# Patient Record
Sex: FEMALE | Race: BLACK OR AFRICAN AMERICAN | Employment: STUDENT | URBAN - METROPOLITAN AREA
[De-identification: names, ages, dates, MRNs, and addresses within clinical notes are randomized per-mention and may not be internally consistent; named-entity substitution may affect disease eponyms.]

---

## 2017-10-09 ENCOUNTER — HOSPITAL ENCOUNTER (OUTPATIENT)
Dept: MAMMOGRAPHY | Facility: HOSPITAL | Age: 42
Discharge: HOME | End: 2017-10-09
Attending: ADVANCED PRACTICE MIDWIFE
Payer: COMMERCIAL

## 2017-10-09 DIAGNOSIS — Z12.31 VISIT FOR SCREENING MAMMOGRAM: ICD-10-CM

## 2017-10-09 PROCEDURE — 77067 SCR MAMMO BI INCL CAD: CPT | Performed by: ADVANCED PRACTICE MIDWIFE

## 2017-10-09 PROCEDURE — 77063 BREAST TOMOSYNTHESIS BI: CPT | Performed by: ADVANCED PRACTICE MIDWIFE

## 2017-10-17 ENCOUNTER — HOSPITAL ENCOUNTER (OUTPATIENT)
Dept: MAMMOGRAPHY | Age: 42
Discharge: HOME | End: 2017-10-17
Attending: ADVANCED PRACTICE MIDWIFE
Payer: COMMERCIAL

## 2017-10-17 ENCOUNTER — HOSPITAL ENCOUNTER (OUTPATIENT)
Dept: ULTRASOUND IMAGING | Age: 42
Discharge: HOME | End: 2017-10-17
Attending: ADVANCED PRACTICE MIDWIFE
Payer: COMMERCIAL

## 2017-10-17 DIAGNOSIS — R92.8 ABNORMAL MAMMOGRAM OF LEFT BREAST: ICD-10-CM

## 2017-10-17 PROCEDURE — 76642 ULTRASOUND BREAST LIMITED: CPT | Performed by: ADVANCED PRACTICE MIDWIFE

## 2017-10-17 PROCEDURE — 77065 DX MAMMO INCL CAD UNI: CPT | Performed by: ADVANCED PRACTICE MIDWIFE

## 2017-10-17 PROCEDURE — 77061 BREAST TOMOSYNTHESIS UNI: CPT | Performed by: ADVANCED PRACTICE MIDWIFE

## 2018-11-06 ENCOUNTER — HOSPITAL ENCOUNTER (OUTPATIENT)
Dept: MAMMOGRAPHY | Facility: HOSPITAL | Age: 43
Discharge: HOME | End: 2018-11-06
Attending: ADVANCED PRACTICE MIDWIFE
Payer: COMMERCIAL

## 2018-11-06 DIAGNOSIS — Z12.31 SCREENING MAMMOGRAM, ENCOUNTER FOR: ICD-10-CM

## 2018-11-06 PROCEDURE — 77063 BREAST TOMOSYNTHESIS BI: CPT | Performed by: ADVANCED PRACTICE MIDWIFE

## 2018-11-06 PROCEDURE — 77067 SCR MAMMO BI INCL CAD: CPT | Performed by: ADVANCED PRACTICE MIDWIFE

## 2019-02-08 ENCOUNTER — GENETICS ENCOUNTER (OUTPATIENT)
Dept: GENETICS | Facility: HOSPITAL | Age: 44
End: 2019-02-08
Attending: GENETIC COUNSELOR, MS
Payer: COMMERCIAL

## 2019-02-08 PROCEDURE — 96040 HC GENETIC COUNSELING EA 30 MIN: CPT | Performed by: GENETIC COUNSELOR, MS

## 2019-02-11 NOTE — PROGRESS NOTES
Referring Provider: Dr. Paloma Otoole    Reason for Referral:  Ms. Praveen Bae was referred for genetic counseling because of a family history of breast cancer. Ms. Jesse Monroe has no personal history of cancer. She has uterine fibroids.   She had a uter see the pedigree for additional family history information. Counseling: The following information was discussed with Ms. Murphy    Genetics of Breast Cancer: In the [place], approximately 1 in 8 women (12%) will develop breast cancer.   Alth DNA change that may or may not alter the function of the gene; therefore, it is usually not possible to determine if the gene variant is responsible for an individual’s increased cancer risk. If Ms. Murphy is found to carry a pathogenic variant in a ca not feel genetic test results would be helpful to her. Given the relatively low likelihood of finding a mutation and the limited medical care available to her in [facility], Ms. Murphy was reassured that forgoing genetic testing is a reasonable choice.   Dain Knowles

## 2019-02-19 RX ORDER — PYRIDOXINE HCL (VITAMIN B6) 100 MG
1 TABLET ORAL DAILY
Status: ON HOLD | COMMUNITY
End: 2019-03-05

## 2019-03-04 ENCOUNTER — ANESTHESIA EVENT (OUTPATIENT)
Dept: SURGERY | Facility: HOSPITAL | Age: 44
End: 2019-03-04
Payer: COMMERCIAL

## 2019-03-05 ENCOUNTER — ANESTHESIA (OUTPATIENT)
Dept: SURGERY | Facility: HOSPITAL | Age: 44
End: 2019-03-05
Payer: COMMERCIAL

## 2019-03-05 ENCOUNTER — HOSPITAL ENCOUNTER (OUTPATIENT)
Facility: HOSPITAL | Age: 44
Discharge: HOME | End: 2019-03-06
Attending: OBSTETRICS & GYNECOLOGY | Admitting: OBSTETRICS & GYNECOLOGY
Payer: COMMERCIAL

## 2019-03-05 PROBLEM — D21.9 LEIOMYOMA: Status: ACTIVE | Noted: 2019-03-05

## 2019-03-05 LAB
POCT LOT NUMBER: NORMAL
POCT URINE PREGNANCY: NEGATIVE

## 2019-03-05 PROCEDURE — 81025 URINE PREGNANCY TEST: CPT | Performed by: OBSTETRICS & GYNECOLOGY

## 2019-03-05 PROCEDURE — 0TJB8ZZ INSPECTION OF BLADDER, VIA NATURAL OR ARTIFICIAL OPENING ENDOSCOPIC: ICD-10-PCS | Performed by: OBSTETRICS & GYNECOLOGY

## 2019-03-05 PROCEDURE — 0UT74ZZ RESECTION OF BILATERAL FALLOPIAN TUBES, PERCUTANEOUS ENDOSCOPIC APPROACH: ICD-10-PCS | Performed by: OBSTETRICS & GYNECOLOGY

## 2019-03-05 PROCEDURE — 0UT94ZZ RESECTION OF UTERUS, PERCUTANEOUS ENDOSCOPIC APPROACH: ICD-10-PCS | Performed by: OBSTETRICS & GYNECOLOGY

## 2019-03-05 PROCEDURE — 8E0W4CZ ROBOTIC ASSISTED PROCEDURE OF TRUNK REGION, PERCUTANEOUS ENDOSCOPIC APPROACH: ICD-10-PCS | Performed by: OBSTETRICS & GYNECOLOGY

## 2019-03-05 PROCEDURE — 88307 TISSUE EXAM BY PATHOLOGIST: CPT | Performed by: OBSTETRICS & GYNECOLOGY

## 2019-03-05 RX ORDER — SODIUM CHLORIDE, SODIUM LACTATE, POTASSIUM CHLORIDE, CALCIUM CHLORIDE 600; 310; 30; 20 MG/100ML; MG/100ML; MG/100ML; MG/100ML
INJECTION, SOLUTION INTRAVENOUS CONTINUOUS
Status: DISCONTINUED | OUTPATIENT
Start: 2019-03-05 | End: 2019-03-05 | Stop reason: HOSPADM

## 2019-03-05 RX ORDER — HYDROMORPHONE HYDROCHLORIDE 1 MG/ML
0.2 INJECTION, SOLUTION INTRAMUSCULAR; INTRAVENOUS; SUBCUTANEOUS EVERY 2 HOUR PRN
Status: DISCONTINUED | OUTPATIENT
Start: 2019-03-05 | End: 2019-03-06

## 2019-03-05 RX ORDER — METOCLOPRAMIDE HYDROCHLORIDE 5 MG/ML
10 INJECTION INTRAMUSCULAR; INTRAVENOUS AS NEEDED
Status: DISCONTINUED | OUTPATIENT
Start: 2019-03-05 | End: 2019-03-05 | Stop reason: HOSPADM

## 2019-03-05 RX ORDER — HYDROCODONE BITARTRATE AND ACETAMINOPHEN 5; 325 MG/1; MG/1
2 TABLET ORAL EVERY 4 HOURS PRN
Status: DISCONTINUED | OUTPATIENT
Start: 2019-03-05 | End: 2019-03-05

## 2019-03-05 RX ORDER — ZOLPIDEM TARTRATE 5 MG/1
5 TABLET ORAL NIGHTLY PRN
Status: DISCONTINUED | OUTPATIENT
Start: 2019-03-05 | End: 2019-03-06

## 2019-03-05 RX ORDER — HYDROMORPHONE HYDROCHLORIDE 1 MG/ML
0.4 INJECTION, SOLUTION INTRAMUSCULAR; INTRAVENOUS; SUBCUTANEOUS EVERY 2 HOUR PRN
Status: DISCONTINUED | OUTPATIENT
Start: 2019-03-05 | End: 2019-03-06

## 2019-03-05 RX ORDER — HYDROCODONE BITARTRATE AND ACETAMINOPHEN 5; 325 MG/1; MG/1
1 TABLET ORAL EVERY 4 HOURS PRN
Status: DISCONTINUED | OUTPATIENT
Start: 2019-03-05 | End: 2019-03-05

## 2019-03-05 RX ORDER — HEPARIN SODIUM 5000 [USP'U]/ML
5000 INJECTION, SOLUTION INTRAVENOUS; SUBCUTANEOUS ONCE
Status: COMPLETED | OUTPATIENT
Start: 2019-03-05 | End: 2019-03-05

## 2019-03-05 RX ORDER — ACETAMINOPHEN 500 MG
1000 TABLET ORAL ONCE
Status: DISCONTINUED | OUTPATIENT
Start: 2019-03-05 | End: 2019-03-05

## 2019-03-05 RX ORDER — ONDANSETRON 4 MG/1
4 TABLET, FILM COATED ORAL EVERY 8 HOURS PRN
Status: DISCONTINUED | OUTPATIENT
Start: 2019-03-05 | End: 2019-03-06

## 2019-03-05 RX ORDER — ACETAMINOPHEN 10 MG/ML
1000 INJECTION, SOLUTION INTRAVENOUS EVERY 6 HOURS
Status: COMPLETED | OUTPATIENT
Start: 2019-03-05 | End: 2019-03-06

## 2019-03-05 RX ORDER — METOCLOPRAMIDE HYDROCHLORIDE 5 MG/ML
10 INJECTION INTRAMUSCULAR; INTRAVENOUS EVERY 6 HOURS PRN
Status: DISCONTINUED | OUTPATIENT
Start: 2019-03-05 | End: 2019-03-06

## 2019-03-05 RX ORDER — BUPIVACAINE HYDROCHLORIDE AND EPINEPHRINE 5; 5 MG/ML; UG/ML
INJECTION, SOLUTION EPIDURAL; INTRACAUDAL; PERINEURAL AS NEEDED
Status: DISCONTINUED | OUTPATIENT
Start: 2019-03-05 | End: 2019-03-05 | Stop reason: HOSPADM

## 2019-03-05 RX ORDER — NALOXONE HYDROCHLORIDE 0.4 MG/ML
80 INJECTION, SOLUTION INTRAMUSCULAR; INTRAVENOUS; SUBCUTANEOUS AS NEEDED
Status: DISCONTINUED | OUTPATIENT
Start: 2019-03-05 | End: 2019-03-05 | Stop reason: HOSPADM

## 2019-03-05 RX ORDER — HYDROCODONE BITARTRATE AND ACETAMINOPHEN 5; 325 MG/1; MG/1
1 TABLET ORAL EVERY 4 HOURS PRN
Status: DISCONTINUED | OUTPATIENT
Start: 2019-03-06 | End: 2019-03-06

## 2019-03-05 RX ORDER — ONDANSETRON 2 MG/ML
4 INJECTION INTRAMUSCULAR; INTRAVENOUS EVERY 8 HOURS PRN
Status: DISCONTINUED | OUTPATIENT
Start: 2019-03-05 | End: 2019-03-06

## 2019-03-05 RX ORDER — LABETALOL HYDROCHLORIDE 5 MG/ML
5 INJECTION, SOLUTION INTRAVENOUS EVERY 5 MIN PRN
Status: DISCONTINUED | OUTPATIENT
Start: 2019-03-05 | End: 2019-03-05 | Stop reason: HOSPADM

## 2019-03-05 RX ORDER — HYDROMORPHONE HYDROCHLORIDE 1 MG/ML
0.8 INJECTION, SOLUTION INTRAMUSCULAR; INTRAVENOUS; SUBCUTANEOUS EVERY 2 HOUR PRN
Status: DISCONTINUED | OUTPATIENT
Start: 2019-03-05 | End: 2019-03-06

## 2019-03-05 RX ORDER — KETOROLAC TROMETHAMINE 30 MG/ML
30 INJECTION, SOLUTION INTRAMUSCULAR; INTRAVENOUS EVERY 6 HOURS
Status: DISCONTINUED | OUTPATIENT
Start: 2019-03-05 | End: 2019-03-06

## 2019-03-05 RX ORDER — ACETAMINOPHEN 325 MG/1
650 TABLET ORAL EVERY 4 HOURS PRN
Status: DISCONTINUED | OUTPATIENT
Start: 2019-03-05 | End: 2019-03-05

## 2019-03-05 RX ORDER — FAMOTIDINE 20 MG/1
20 TABLET ORAL 2 TIMES DAILY PRN
Status: DISCONTINUED | OUTPATIENT
Start: 2019-03-05 | End: 2019-03-06

## 2019-03-05 RX ORDER — HYDROMORPHONE HYDROCHLORIDE 1 MG/ML
0.4 INJECTION, SOLUTION INTRAMUSCULAR; INTRAVENOUS; SUBCUTANEOUS EVERY 5 MIN PRN
Status: DISCONTINUED | OUTPATIENT
Start: 2019-03-05 | End: 2019-03-05 | Stop reason: HOSPADM

## 2019-03-05 RX ORDER — DEXTROSE MONOHYDRATE, SODIUM CHLORIDE, SODIUM LACTATE, POTASSIUM CHLORIDE, CALCIUM CHLORIDE 5; 600; 310; 179; 20 G/100ML; MG/100ML; MG/100ML; MG/100ML; MG/100ML
INJECTION, SOLUTION INTRAVENOUS CONTINUOUS
Status: DISCONTINUED | OUTPATIENT
Start: 2019-03-05 | End: 2019-03-06

## 2019-03-05 RX ORDER — SODIUM CHLORIDE, SODIUM LACTATE, POTASSIUM CHLORIDE, CALCIUM CHLORIDE 600; 310; 30; 20 MG/100ML; MG/100ML; MG/100ML; MG/100ML
INJECTION, SOLUTION INTRAVENOUS CONTINUOUS
Status: DISCONTINUED | OUTPATIENT
Start: 2019-03-05 | End: 2019-03-05

## 2019-03-05 RX ORDER — HYDROCODONE BITARTRATE AND ACETAMINOPHEN 5; 325 MG/1; MG/1
2 TABLET ORAL EVERY 4 HOURS PRN
Status: DISCONTINUED | OUTPATIENT
Start: 2019-03-06 | End: 2019-03-06

## 2019-03-05 RX ORDER — ACETAMINOPHEN 325 MG/1
650 TABLET ORAL EVERY 4 HOURS PRN
Status: DISCONTINUED | OUTPATIENT
Start: 2019-03-06 | End: 2019-03-06

## 2019-03-05 RX ORDER — DIPHENHYDRAMINE HYDROCHLORIDE 50 MG/ML
12.5 INJECTION INTRAMUSCULAR; INTRAVENOUS EVERY 4 HOURS PRN
Status: DISCONTINUED | OUTPATIENT
Start: 2019-03-05 | End: 2019-03-06

## 2019-03-05 RX ORDER — ONDANSETRON 2 MG/ML
4 INJECTION INTRAMUSCULAR; INTRAVENOUS AS NEEDED
Status: DISCONTINUED | OUTPATIENT
Start: 2019-03-05 | End: 2019-03-05 | Stop reason: HOSPADM

## 2019-03-05 RX ORDER — CEFAZOLIN SODIUM/WATER 2 G/20 ML
2 SYRINGE (ML) INTRAVENOUS ONCE
Status: COMPLETED | OUTPATIENT
Start: 2019-03-05 | End: 2019-03-05

## 2019-03-05 NOTE — ANESTHESIA PREPROCEDURE EVALUATION
PRE-OP EVALUATION    Patient Name: Juan Antonio Gutierrez    Pre-op Diagnosis: FIBROID UTERUS, PELVIC PAIN  , MENORRHAGIA    Procedure(s):  DAVINCI ROBOTIC ASSISTED TOTAL LAPAROSCOPIC HYSTERECTOMY WITH BILATERAL SALPINGECTOMY  AND CYSTOSCOPY    Surgeon(s) and Never Smoker      Smokeless tobacco: Never Used    Alcohol use: Yes      Alcohol/week: 0.6 oz      Types: 1 Glasses of wine per week      Comment: 1 glass/week      Drug use: No     Available pre-op labs reviewed. Airway      Mallampati:  I  M

## 2019-03-05 NOTE — OPERATIVE REPORT
[facility]    PATIENT'S NAME: Tracytrae Benson   ATTENDING PHYSICIAN: Savannah Pinzon M.D. OPERATING PHYSICIAN: Savannah Pinzon M.D.    PATIENT ACCOUNT#:   [de-identified]    LOCATION:  [address] [place] [address]  MEDICAL RECORD #:   [id number]       DATE [facility] noted to be 13 to 16 week size and irregular. The 's gloves were changed, and the abdominal portion of the procedure was done. Marcaine was injected in the umbilicus. A small incision was made.   A Veress needle was placed and the drop test was p open the retroperitoneum and to skeletonize the uterus.   There was a large fibroid in the left lower portion which made this dissection somewhat difficult, but continued dissection, coagulation and cautery were performed, taking care to ensure that the ure incorporated into the closure as well as the angles were reapproximated and incorporated into the closure. There was no evidence of bleeding. The abdominopelvic cavity was irrigated. Adequate hemostasis was noted.   Carbon dioxide gas was then removed, a

## 2019-03-05 NOTE — ANESTHESIA POSTPROCEDURE EVALUATION
[address] Patient Status:  Outpatient in a Bed   Age/Gender 44year old female MRN [id number]   [facility] SURGERY Attending Xochilt Largo, [facility] Day # 0 PCP CHUCK Clemons       Anesthesia Post-op Note

## 2019-03-05 NOTE — PLAN OF CARE
Received patient at 1200 from OR, patient drowsy but a+ox4 and following all commands. Abdominal incision sites are c/d/i and kisha with derma bond. Rosangela pad in place with scant drainage.  Thompson in place with clear yellow urine and draining without difficulty

## 2019-03-05 NOTE — H&P
[facility]    PATIENT'S NAME: Chelita Beasley   ATTENDING PHYSICIAN: Alejandra Moise M.D.    PATIENT ACCOUNT#:   [de-identified]    LOCATION:  [address] [place] [address] 8 ED 10  MEDICAL RECORD #:   [id number]       YOB: 1975  ADMISSION D hysterectomy and bilateral salpingectomy. PAST MEDICAL HISTORY:  History of anemia, 2013, presently resolved; history of dysmenorrhea; history of menorrhagia; history of leiomyoma of uterus.     PAST SURGICAL HISTORY:  In 2013, benign tumor removed from scheduled for robotic-assisted total laparoscopic hysterectomy and bilateral salpingectomy. The procedure and risks of procedure were discussed in depth with the patient.   The patient states she understands the procedure and risks of procedure and consent

## 2019-03-05 NOTE — PROGRESS NOTES
03/05/19 1310   Clinical Encounter Type   Referral To Nurse  (Christian Hall made a referral to [employer] for [employer] as requested. )   Shinto Encounters   Spiritual Requests During Visit / Hospitalization [facility]

## 2019-03-05 NOTE — OPERATIVE REPORT
[facility]    PATIENT'S NAME: [facility]   ATTENDING PHYSICIAN: Diana Pruitt M.D. OPERATING PHYSICIAN: Albert Thakkar M.D.    PATIENT ACCOUNT#:   [de-identified]    LOCATION:  [address] [place] [address]  MEDICAL RECORD #:   [id number]

## 2019-03-05 NOTE — BRIEF OP NOTE
[facility]  Post-Op Procedure Note    [address] Patient Status:  Outpatient in a Bed    10/19/1975 MRN [id number]   [facility] SURGERY Attending Renetta Reyes MD   [facility] Day # 0 PCP CHUCK Donaldson     Preoperative Diag

## 2019-03-05 NOTE — PLAN OF CARE
NURSING ADMISSION NOTE      Patient admitted via bed. Oriented to room. Safety precautions initiated. Bed in low position. Call light in reach. Pt adm from PACU, s/p hysterectomy.

## 2019-03-06 VITALS
HEART RATE: 59 BPM | SYSTOLIC BLOOD PRESSURE: 100 MMHG | WEIGHT: 142.44 LBS | OXYGEN SATURATION: 100 % | TEMPERATURE: 99 F | HEIGHT: 62 IN | RESPIRATION RATE: 16 BRPM | BODY MASS INDEX: 26.21 KG/M2 | DIASTOLIC BLOOD PRESSURE: 68 MMHG

## 2019-03-06 PROBLEM — D21.9 LEIOMYOMA: Status: RESOLVED | Noted: 2019-03-05 | Resolved: 2019-03-06

## 2019-03-06 LAB
ANION GAP SERPL CALC-SCNC: 5 MMOL/L (ref 0–18)
BASOPHILS # BLD AUTO: 0 X10(3) UL (ref 0–0.2)
BASOPHILS NFR BLD AUTO: 0 %
BUN BLD-MCNC: 4 MG/DL (ref 7–18)
BUN/CREAT SERPL: 4.4 (ref 10–20)
CALCIUM BLD-MCNC: 8.3 MG/DL (ref 8.5–10.1)
CHLORIDE SERPL-SCNC: 112 MMOL/L (ref 98–107)
CO2 SERPL-SCNC: 26 MMOL/L (ref 21–32)
CREAT BLD-MCNC: 0.9 MG/DL (ref 0.55–1.02)
DEPRECATED RDW RBC AUTO: 44.2 FL (ref 35.1–46.3)
EOSINOPHIL # BLD AUTO: 0 X10(3) UL (ref 0–0.7)
EOSINOPHIL NFR BLD AUTO: 0 %
ERYTHROCYTE [DISTWIDTH] IN BLOOD BY AUTOMATED COUNT: 12.7 % (ref 11–15)
GLUCOSE BLD-MCNC: 103 MG/DL (ref 70–99)
HCT VFR BLD AUTO: 30.3 % (ref 35–48)
HGB BLD-MCNC: 10.1 G/DL (ref 12–16)
IMM GRANULOCYTES # BLD AUTO: 0.01 X10(3) UL (ref 0–1)
IMM GRANULOCYTES NFR BLD: 0.2 %
LYMPHOCYTES # BLD AUTO: 1.21 X10(3) UL (ref 1–4)
LYMPHOCYTES NFR BLD AUTO: 25 %
MCH RBC QN AUTO: 31.4 PG (ref 26–34)
MCHC RBC AUTO-ENTMCNC: 33.3 G/DL (ref 31–37)
MCV RBC AUTO: 94.1 FL (ref 80–100)
MONOCYTES # BLD AUTO: 0.33 X10(3) UL (ref 0.1–1)
MONOCYTES NFR BLD AUTO: 6.8 %
NEUTROPHILS # BLD AUTO: 3.29 X10 (3) UL (ref 1.5–7.7)
NEUTROPHILS # BLD AUTO: 3.29 X10(3) UL (ref 1.5–7.7)
NEUTROPHILS NFR BLD AUTO: 68 %
OSMOLALITY SERPL CALC.SUM OF ELEC: 293 MOSM/KG (ref 275–295)
PLATELET # BLD AUTO: 110 10(3)UL (ref 150–450)
POTASSIUM SERPL-SCNC: 3.8 MMOL/L (ref 3.5–5.1)
RBC # BLD AUTO: 3.22 X10(6)UL (ref 3.8–5.3)
SODIUM SERPL-SCNC: 143 MMOL/L (ref 136–145)
WBC # BLD AUTO: 4.8 X10(3) UL (ref 4–11)

## 2019-03-06 PROCEDURE — 85025 COMPLETE CBC W/AUTO DIFF WBC: CPT | Performed by: OBSTETRICS & GYNECOLOGY

## 2019-03-06 PROCEDURE — 80048 BASIC METABOLIC PNL TOTAL CA: CPT | Performed by: OBSTETRICS & GYNECOLOGY

## 2019-03-06 NOTE — PLAN OF CARE
GASTROINTESTINAL - ADULT    • Minimal or absence of nausea and vomiting Progressing        GENITOURINARY - ADULT    • Absence of urinary retention Progressing        Impaired Activities of Daily Living    • Achieve highest/safest level of independence in s

## 2019-03-06 NOTE — PROGRESS NOTES
[address] Patient Status:  Outpatient in a Bed    10/19/1975 MRN [id number]   [facility] 0SW-A Attending John Wilburn MD   [facility] Day # 0 PCP CHUCK Green       SUBJECTIVE:  Gracy Dumont is a 44 y

## 2019-07-09 NOTE — PROGRESS NOTES
THE [facility] Hematology and Oncology Clinic Note    Diagnosis: Pancytopenia    Treatment History:    1.  ALFREDA 3/5/19    Visit Diagnosis:  Pancytopenia ([address]  (primary encounter diagnosis)    History of Present Illness: 43F with a PMH of Fibroids s/p ALFREDA referred b HYSTERECTOMY     • ROBOT-ASSISTED LAPAROSCOPIC HYSTERECTOMY Bilateral 3/5/2019    Performed by Jason Augustin MD at [facility] MAIN OR     Social History    Socioeconomic History      Marital status: Single      Spouse name: Not on file      Number of children: increased sensitivity for detection of cancer which can be obscured mammographically. Please contact [phone] to schedule.     Pathology:   03/05/19:  Uterus, cervix, and bilateral fallopian tubes; hysterectomy and bilateral salpingectomy:      [employer]

## 2019-07-10 ENCOUNTER — HOSPITAL ENCOUNTER (OUTPATIENT)
Dept: ULTRASOUND IMAGING | Facility: HOSPITAL | Age: 44
Discharge: HOME | End: 2019-07-10
Attending: INTERNAL MEDICINE
Payer: COMMERCIAL

## 2019-07-10 ENCOUNTER — OFFICE VISIT (OUTPATIENT)
Dept: HEMATOLOGY/ONCOLOGY | Facility: HOSPITAL | Age: 44
End: 2019-07-10
Attending: INTERNAL MEDICINE
Payer: COMMERCIAL

## 2019-07-10 VITALS
WEIGHT: 140.81 LBS | RESPIRATION RATE: 18 BRPM | DIASTOLIC BLOOD PRESSURE: 71 MMHG | OXYGEN SATURATION: 99 % | HEART RATE: 75 BPM | BODY MASS INDEX: 25.58 KG/M2 | HEIGHT: 62.01 IN | SYSTOLIC BLOOD PRESSURE: 112 MMHG | TEMPERATURE: 98 F

## 2019-07-10 DIAGNOSIS — D61.818 PANCYTOPENIA (HCC): ICD-10-CM

## 2019-07-10 DIAGNOSIS — D61.818 PANCYTOPENIA (HCC): Primary | ICD-10-CM

## 2019-07-10 LAB
ALBUMIN SERPL-MCNC: 3.8 G/DL (ref 3.4–5)
ALBUMIN/GLOB SERPL: 0.9 {RATIO} (ref 1–2)
ALP LIVER SERPL-CCNC: 45 U/L (ref 37–98)
ALT SERPL-CCNC: 14 U/L (ref 13–56)
ANION GAP SERPL CALC-SCNC: 7 MMOL/L (ref 0–18)
AST SERPL-CCNC: 7 U/L (ref 15–37)
BASOPHILS # BLD AUTO: 0.01 X10(3) UL (ref 0–0.2)
BASOPHILS NFR BLD AUTO: 0.3 %
BILIRUB SERPL-MCNC: 0.5 MG/DL (ref 0.1–2)
BUN BLD-MCNC: 13 MG/DL (ref 7–18)
BUN/CREAT SERPL: 15.5 (ref 10–20)
CALCIUM BLD-MCNC: 9.6 MG/DL (ref 8.5–10.1)
CHLORIDE SERPL-SCNC: 107 MMOL/L (ref 98–112)
CO2 SERPL-SCNC: 22 MMOL/L (ref 21–32)
CREAT BLD-MCNC: 0.84 MG/DL (ref 0.55–1.02)
DEPRECATED HBV CORE AB SER IA-ACNC: 68 NG/ML (ref 12–240)
DEPRECATED RDW RBC AUTO: 40.3 FL (ref 35.1–46.3)
EOSINOPHIL # BLD AUTO: 0.02 X10(3) UL (ref 0–0.7)
EOSINOPHIL NFR BLD AUTO: 0.7 %
ERYTHROCYTE [DISTWIDTH] IN BLOOD BY AUTOMATED COUNT: 12.2 % (ref 11–15)
FOLATE SERPL-MCNC: >100 NG/ML (ref 8.7–?)
GLOBULIN PLAS-MCNC: 4.2 G/DL (ref 2.8–4.4)
GLUCOSE BLD-MCNC: 86 MG/DL (ref 70–99)
HCT VFR BLD AUTO: 40.6 % (ref 35–48)
HCV AB SERPL QL IA: NONREACTIVE
HGB BLD-MCNC: 13.9 G/DL (ref 12–16)
HGB RETIC QN AUTO: 37.7 PG (ref 28.2–36.6)
IMM GRANULOCYTES # BLD AUTO: 0.01 X10(3) UL (ref 0–1)
IMM GRANULOCYTES NFR BLD: 0.3 %
IMM RETICS NFR: 0.04 RATIO (ref 0.1–0.3)
IRON SATURATION: 26 % (ref 15–50)
IRON SERPL-MCNC: 97 UG/DL (ref 50–170)
LDH SERPL L TO P-CCNC: 176 U/L (ref 84–246)
LYMPHOCYTES # BLD AUTO: 1.2 X10(3) UL (ref 1–4)
LYMPHOCYTES NFR BLD AUTO: 40.1 %
M PROTEIN MFR SERPL ELPH: 8 G/DL (ref 6.4–8.2)
MCH RBC QN AUTO: 30.7 PG (ref 26–34)
MCHC RBC AUTO-ENTMCNC: 34.2 G/DL (ref 31–37)
MCV RBC AUTO: 89.6 FL (ref 80–100)
MONOCYTES # BLD AUTO: 0.22 X10(3) UL (ref 0.1–1)
MONOCYTES NFR BLD AUTO: 7.4 %
NEUTROPHILS # BLD AUTO: 1.53 X10 (3) UL (ref 1.5–7.7)
NEUTROPHILS # BLD AUTO: 1.53 X10(3) UL (ref 1.5–7.7)
NEUTROPHILS NFR BLD AUTO: 51.2 %
OSMOLALITY SERPL CALC.SUM OF ELEC: 281 MOSM/KG (ref 275–295)
PLATELET # BLD AUTO: 154 10(3)UL (ref 150–450)
POTASSIUM SERPL-SCNC: 4 MMOL/L (ref 3.5–5.1)
RBC # BLD AUTO: 4.53 X10(6)UL (ref 3.8–5.3)
RETICS # AUTO: 42.6 X10(3) UL (ref 22.5–147.5)
RETICS/RBC NFR AUTO: 0.9 % (ref 0.5–2.5)
SODIUM SERPL-SCNC: 136 MMOL/L (ref 136–145)
T4 FREE SERPL-MCNC: 0.8 NG/DL (ref 0.8–1.7)
TOTAL IRON BINDING CAPACITY: 371 UG/DL (ref 240–450)
TRANSFERRIN SERPL-MCNC: 249 MG/DL (ref 200–360)
TSI SER-ACNC: 4.97 MIU/ML (ref 0.36–3.74)
VIT B12 SERPL-MCNC: 908 PG/ML (ref 193–986)
WBC # BLD AUTO: 3 X10(3) UL (ref 4–11)

## 2019-07-10 PROCEDURE — 99214 OFFICE O/P EST MOD 30 MIN: CPT | Performed by: INTERNAL MEDICINE

## 2019-07-10 PROCEDURE — 76700 US EXAM ABDOM COMPLETE: CPT | Performed by: INTERNAL MEDICINE

## 2019-07-10 NOTE — PROGRESS NOTES
Education Record    Learner:  Patient    Disease / Diagnosis: new consult for anemia     Barriers / Limitations:  None   Comments:    Method:  Discussion   Comments:    General Topics:  Plan of care reviewed   Comments:    Outcome:  Shows understanding   C

## 2019-07-12 LAB — COPPER, SERUM: 108.2 UG/DL

## 2019-07-15 NOTE — PROGRESS NOTES
THE [facility] Hematology and Oncology Clinic Note    Diagnosis: Pancytopenia    Treatment History:    1.  ALFREDA 3/5/19    Visit Diagnosis:  Leukopenia, unspecified type  (primary encounter diagnosis)    History of Present Illness: 43F with a PMH of Fibroids s/p ALFREDA External Solution Apply topically 2 (two) times daily. Disp:  Rfl: 4   Calcium Carbonate-Vitamin D (CALCIUM + D OR) Take 1 tablet by mouth daily.  Disp:  Rfl:    Iron polysacch [id number]-BY (NIFEREX FORTE) 150-0.025-1 MG Oral Cap Take 1 capsule by mouth d 07/10/2019       Radiology: I personally reviewed the imaging  11/2018  BIRADS Code 1: NEGATIVE ASSESSMENT. RECOMMENDATIONS:    FOLLOW-UP: Routine screening follow-up recommended.        A letter explaining the results in lay terms has been sent to th She will establish with a primary care doctor in [facility] with trend yearly CBC  - Contact information: Use [employer] or email address: [email or web address]    Family History of Breast Cancer   - Mammogram from 11/2018 was BIRADS1, I discussed with patient

## 2019-07-16 ENCOUNTER — OFFICE VISIT (OUTPATIENT)
Dept: HEMATOLOGY/ONCOLOGY | Facility: HOSPITAL | Age: 44
End: 2019-07-16
Attending: INTERNAL MEDICINE
Payer: COMMERCIAL

## 2019-07-16 VITALS
HEART RATE: 73 BPM | DIASTOLIC BLOOD PRESSURE: 68 MMHG | WEIGHT: 139 LBS | RESPIRATION RATE: 18 BRPM | TEMPERATURE: 98 F | BODY MASS INDEX: 25 KG/M2 | OXYGEN SATURATION: 99 % | SYSTOLIC BLOOD PRESSURE: 116 MMHG

## 2019-07-16 DIAGNOSIS — D72.819 LEUKOPENIA, UNSPECIFIED TYPE: Primary | ICD-10-CM

## 2019-07-16 LAB
BASOPHILS # BLD AUTO: 0.01 X10(3) UL (ref 0–0.2)
BASOPHILS NFR BLD AUTO: 0.4 %
DEPRECATED RDW RBC AUTO: 39.9 FL (ref 35.1–46.3)
EOSINOPHIL # BLD AUTO: 0.03 X10(3) UL (ref 0–0.7)
EOSINOPHIL NFR BLD AUTO: 1.1 %
ERYTHROCYTE [DISTWIDTH] IN BLOOD BY AUTOMATED COUNT: 12.3 % (ref 11–15)
HCT VFR BLD AUTO: 37.4 % (ref 35–48)
HGB BLD-MCNC: 12.9 G/DL (ref 12–16)
IMM GRANULOCYTES # BLD AUTO: 0 X10(3) UL (ref 0–1)
IMM GRANULOCYTES NFR BLD: 0 %
LYMPHOCYTES # BLD AUTO: 1.21 X10(3) UL (ref 1–4)
LYMPHOCYTES NFR BLD AUTO: 44.8 %
MCH RBC QN AUTO: 31 PG (ref 26–34)
MCHC RBC AUTO-ENTMCNC: 34.5 G/DL (ref 31–37)
MCV RBC AUTO: 89.9 FL (ref 80–100)
MONOCYTES # BLD AUTO: 0.17 X10(3) UL (ref 0.1–1)
MONOCYTES NFR BLD AUTO: 6.3 %
NEUTROPHILS # BLD AUTO: 1.28 X10 (3) UL (ref 1.5–7.7)
NEUTROPHILS # BLD AUTO: 1.28 X10(3) UL (ref 1.5–7.7)
NEUTROPHILS NFR BLD AUTO: 47.4 %
PLATELET # BLD AUTO: 142 10(3)UL (ref 150–450)
RBC # BLD AUTO: 4.16 X10(6)UL (ref 3.8–5.3)
WBC # BLD AUTO: 2.7 X10(3) UL (ref 4–11)

## 2019-07-16 PROCEDURE — 99213 OFFICE O/P EST LOW 20 MIN: CPT | Performed by: INTERNAL MEDICINE

## 2019-07-16 NOTE — PROGRESS NOTES
Education Record    Learner:  Patient    Barriers / Limitations:  None   Comments:    Method:  Discussion   Comments:    General Topics:  Plan of care reviewed   Comments:    Outcome:  Shows understanding   Comments:    Patient here for one week follow-up

## 2019-07-17 LAB
CD10 CELLS NFR SPEC: <1 %
CD11C CELLS NFR SPEC: 9 %
CD14 CELLS NFR SPEC: <1 %
CD19 CELLS NFR SPEC: 9 %
CD19/CD10 CELLS: <1 %
CD20 CELLS NFR SPEC: 9 %
CD22 CELLS NFR SPEC: 9 %
CD23 CELLS NFR SPEC: 4 %
CD3 CELLS NFR SPEC: 76 %
CD3+CD4+ CELLS NFR SPEC: 53 %
CD3+CD4+ CELLS/CD3+CD8+ CLL SPEC: 2.5
CD3+CD8+ CELLS NFR SPEC: 21 %
CD45 CELLS NFR SPEC: 99 %
CD5 CELLS NFR SPEC: 81 %
CD5/CD19 CELLS: 3 %
CD56 CELLS NFR SPEC: 13 %
CD7 CELLS NFR SPEC: 79 %
CELL SURF KAPPA/LAMBDA RATIO: 1.7
CELL SURF LAMBDA LIGHT CHAIN: 3 %
CELL SURFACE KAPPA LIGHT CHAIN: 5 %
FMC7 CELLS NFR SPEC: 9 %

## (undated) DEVICE — AIRSEAL 8 MM ACCESS PORT AND LOW PROFILE OBTURATOR WITH BLADELESS OPTICAL TIP, 100 MM LENGTH: Brand: AIRSEAL

## (undated) DEVICE — MONOPOLAR CURVED SCISSORS: Brand: ENDOWRIST;DAVINCI SI

## (undated) DEVICE — DV OBTURATOR BLADELESS 8MM

## (undated) DEVICE — GAMMEX® PI HYBRID SIZE 5.5, STERILE POWDER-FREE SURGICAL GLOVE, POLYISOPRENE AND NEOPRENE BLEND: Brand: GAMMEX

## (undated) DEVICE — 40580 - THE PINK PAD - ADVANCED TRENDELENBURG POSITIONING KIT: Brand: 40580 - THE PINK PAD - ADVANCED TRENDELENBURG POSITIONING KIT

## (undated) DEVICE — PLUMEPORT ACTIV LAPAROSCOPIC SMOKE FILTRATION DEVICE: Brand: PLUMEPORT ACTIVE

## (undated) DEVICE — DV KIT ACCESSORY 4-ARM

## (undated) DEVICE — FENESTRATED BIPOLAR FORCEPS: Brand: ENDOWRIST;DAVINCI SI

## (undated) DEVICE — MEGA NEEDLE DRIVER: Brand: ENDOWRIST;DAVINCI SI

## (undated) DEVICE — ELECTRO LUBE IS A SINGLE PATIENT USE DEVICE THAT IS INTENDED TO BE USED ON ELECTROSURGICAL ELECTRODES TO REDUCE STICKING.: Brand: KEY SURGICAL ELECTRO LUBE

## (undated) DEVICE — DERMABOND LIQUID ADHESIVE

## (undated) DEVICE — NON-ADHERENT PAD PREPACK: Brand: TELFA

## (undated) DEVICE — PK INSTRUMENT CORD, G400 GENERATOR: Brand: PK

## (undated) DEVICE — SUTURE VICRYL 0

## (undated) DEVICE — SYRINGE 50ML LL TIP

## (undated) DEVICE — TIP COVER ACCESSORY

## (undated) DEVICE — GYN LAP/ROBOTIC: Brand: MEDLINE INDUSTRIES, INC.

## (undated) DEVICE — SUTURE MONOCRYL 4-0 PS-2

## (undated) DEVICE — INSUFFLATION NEEDLE TO ESTABLISH PNEUMOPERITONEUM.: Brand: INSUFFLATION NEEDLE

## (undated) DEVICE — DELINEATOR UTERINE MANIPULATOR

## (undated) DEVICE — GLOVE SURG TRIUMPH SZ 6

## (undated) DEVICE — SUTURE VLOC 180 2-0 12\" 0315

## (undated) DEVICE — 1010 S-DRAPE TOWEL DRAPE 10/BX: Brand: STERI-DRAPE™

## (undated) DEVICE — COBRA GRASPER: Brand: ENDOWRIST;DAVINCI SI

## (undated) DEVICE — SOL H2O 1000ML BTL

## (undated) DEVICE — TROCAR: Brand: KII FIOS FIRST ENTRY

## (undated) DEVICE — 3M(TM) TEGADERM(TM) TRANSPARENT FILM DRESSING FRAME STYLE 9505W: Brand: 3M™ TEGADERM™

## (undated) DEVICE — TRI-LUMEN FILTERED TUBE SET WITH ACTIVATED CHARCOAL FILTER: Brand: AIRSEAL

## (undated) DEVICE — KENDALL SCD EXPRESS SLEEVES, KNEE LENGTH, MEDIUM: Brand: KENDALL SCD

## (undated) DEVICE — PROGRASP FORCEPS: Brand: ENDOWRIST;DAVINCI SI